# Patient Record
Sex: MALE | URBAN - METROPOLITAN AREA
[De-identification: names, ages, dates, MRNs, and addresses within clinical notes are randomized per-mention and may not be internally consistent; named-entity substitution may affect disease eponyms.]

---

## 2020-04-14 NOTE — TELEPHONE ENCOUNTER
FUTURE VISIT INFORMATION      FUTURE VISIT INFORMATION:    Date: 6/16/2020    Time: 1PM    Location: [facility] [place]  REFERRAL INFORMATION:    Referring provider:      Referring providers clinic:      Reason for visit/diagnosis  sleep apnea // scheduled by patient // records unknown could not retrieve //    RECORDS REQUESTED FROM:       Clinic name Comments Records Status Imaging Status   [facility]   12/12/2008 Sleep Study    Report in care Everywhere     [facility]   07/27/2015 notes with Nico Dinero MD   Care Everywhere

## 2020-04-30 ENCOUNTER — TELEPHONE (OUTPATIENT)
Dept: OTOLARYNGOLOGY | Facility: CLINIC | Age: 45
End: 2020-04-30

## 2020-04-30 NOTE — TELEPHONE ENCOUNTER
Called patient and left a VM.     Offered a sooner virtual/telephone visit with Dr. Chapman.      Patient is welcome to reschedule to any open NEW slot as a virtual or phone visit if they would like to reschedule appointment to sooner date.      If patient would like to do sooner video visit, please confirm email and let patient know that a clinic staff member will reach out 2 days prior to help set up appointment. If patient would prefer phone visit please confirm best phone number for patient.

## 2020-06-12 ENCOUNTER — TELEPHONE (OUTPATIENT)
Dept: OTOLARYNGOLOGY | Facility: CLINIC | Age: 45
End: 2020-06-12

## 2020-06-12 NOTE — TELEPHONE ENCOUNTER
LVM letting pt know due to COVID-19 protocol Dr. Chapman is limited in patients that she can see in clinic at this time. Offered return video visit via [employer] touchpoint for appt on 6/16 with provider.     Left call center number for conversion/rescheduling.      If pt would like to complete video visit please confirm pt e-mail. Please send tip sheet via [employer]. Pt is welcome to do telephone appt if unable to facilitate a video appt.       If pt would prefer to wait to be seen in clinic please reschedule appts 8+ weeks out.

## 2020-06-15 NOTE — TELEPHONE ENCOUNTER
2nd attempt  LVM letting pt know due to COVID-19 protocol Dr. Chapman is limited in patients that she can see in clinic at this time. Offered return video visit via [employer] touchpoint for appt on 6/16 with provider.      Left call center number for conversion/rescheduling.      If pt would like to complete video visit please confirm pt e-mail. Please send tip sheet via [employer]. Pt is welcome to do telephone appt if unable to facilitate a video appt.       If pt would prefer to wait to be seen in clinic please reschedule appts 8+ weeks out.

## 2020-06-16 ENCOUNTER — PRE VISIT (OUTPATIENT)
Dept: OTOLARYNGOLOGY | Facility: CLINIC | Age: 45
End: 2020-06-16

## 2024-09-25 DIAGNOSIS — H40.009 GLAUCOMA SUSPECT: Primary | ICD-10-CM

## 2024-11-12 ENCOUNTER — OFFICE VISIT (OUTPATIENT)
Dept: OPHTHALMOLOGY | Facility: CLINIC | Age: 49
End: 2024-11-12
Attending: OPHTHALMOLOGY
Payer: COMMERCIAL

## 2024-11-12 DIAGNOSIS — H40.009 GLAUCOMA SUSPECT: Primary | ICD-10-CM

## 2024-11-12 DIAGNOSIS — H40.1222 LOW TENSION GLAUCOMA OF LEFT EYE, MODERATE STAGE: Primary | ICD-10-CM

## 2024-11-12 PROCEDURE — 99204 OFFICE O/P NEW MOD 45 MIN: CPT | Performed by: OPHTHALMOLOGY

## 2024-11-12 PROCEDURE — 92083 EXTENDED VISUAL FIELD XM: CPT | Performed by: OPHTHALMOLOGY

## 2024-11-12 PROCEDURE — G0463 HOSPITAL OUTPT CLINIC VISIT: HCPCS | Performed by: OPHTHALMOLOGY

## 2024-11-12 PROCEDURE — 76514 ECHO EXAM OF EYE THICKNESS: CPT | Performed by: OPHTHALMOLOGY

## 2024-11-12 PROCEDURE — 92133 CPTRZD OPH DX IMG PST SGM ON: CPT | Performed by: OPHTHALMOLOGY

## 2024-11-12 PROCEDURE — 92015 DETERMINE REFRACTIVE STATE: CPT

## 2024-11-12 RX ORDER — LATANOPROST 50 UG/ML
1 SOLUTION/ DROPS OPHTHALMIC AT BEDTIME
Qty: 2.5 ML | Refills: 11 | Status: SHIPPED | OUTPATIENT
Start: 2024-11-12

## 2024-11-12 ASSESSMENT — REFRACTION_MANIFEST
OS_SPHERE: -1.50
OD_CYLINDER: +1.00
OD_SPHERE: -1.00
OS_ADD: +1.75
OD_ADD: +1.75
OS_AXIS: 165
OD_AXIS: 015
OS_CYLINDER: +0.75

## 2024-11-12 ASSESSMENT — CONF VISUAL FIELD
OD_NORMAL: 1
OS_INFERIOR_TEMPORAL_RESTRICTION: 0
OD_SUPERIOR_TEMPORAL_RESTRICTION: 0
OS_SUPERIOR_NASAL_RESTRICTION: 0
OD_INFERIOR_NASAL_RESTRICTION: 0
OD_INFERIOR_TEMPORAL_RESTRICTION: 0
OS_NORMAL: 1
OD_SUPERIOR_NASAL_RESTRICTION: 0
OS_INFERIOR_NASAL_RESTRICTION: 0
OS_SUPERIOR_TEMPORAL_RESTRICTION: 0

## 2024-11-12 ASSESSMENT — TONOMETRY
IOP_METHOD: TONOPEN
OD_IOP_MMHG: 15
OS_IOP_MMHG: 17

## 2024-11-12 ASSESSMENT — VISUAL ACUITY
METHOD: SNELLEN - LINEAR
OD_SC: 20/20
OD_SC+: -2
OS_SC+: -2
OS_SC: 20/20

## 2024-11-12 ASSESSMENT — PACHYMETRY
OD_CT(UM): 466
OS_CT(UM): 463

## 2024-11-12 ASSESSMENT — CUP TO DISC RATIO
OD_RATIO: 0.7
OS_RATIO: 0.90

## 2024-11-12 ASSESSMENT — SLIT LAMP EXAM - LIDS
COMMENTS: NORMAL
COMMENTS: NORMAL

## 2024-11-12 NOTE — PROGRESS NOTES
Chief Complaint/Presenting Concern: Glaucoma suspect     History of Present Illness:   Dilip Escobar is a 49 year old patient who presents for evaluation of glaucoma suspect based on large cup to disc ration, followed up previously by Dr. Clark at [facility].     Relevant Past Medical/Family/Social History:  Type 2 Diabetes Mellitus  Obstructive sleep apnea  Essential Hypertension  Adjustment disorder with anxiety    Relevant Review of Systems: None      Diagnosis: Glaucoma suspect due to abnormal disc or VF findings with low IOP  Steroid Response?? Patient not sure  Previous glaucoma surgery/laser  Maximum intraocular pressure 15/17  Currently Meds: None   Family history: negative  CCT: 466/463  Gonio: open each eye to scleral spur   Refractive status: myopia  Trauma history: negative  Steroid exposure: positive in 2010 he was prescribed topical steroids for eye allergy for about two years   Vasospastic disease: Migrane/Raynaud phenomenon: negative  A past hemodynamic crisis or Low BP:: negative  Meds AEs/intolerance:  PMHx: No history of Asthma and respiratory problems/Cardiac/Renal/Kidney stones/Sulfa Allergy    Today's testing:  Visual field November 12, 2024:  Right eye - none specific depressed points, baseline   Left eye - superior arcuate? Inferior depression, baseline  OCT Optic Nerve RNFL Spectralis November 12, 2024  right eye: mild temporal RNFL thinning, baseline   left eye: superior, inferior, and temporal RNFL thinnning, baseline     Additional Ocular History:    Myopia each eye   2. Dry eyes     Plan/Recommendations:  Discussed findings with patient.  Suspect NTG left eye in the setting of thin CCT. Recommend starting treatment. There is a chance patient previously had elevated IOP in the setting of intermittent use of steroid which would explain patient's ONH damage.   Start Latanoprost at bedtime each eye   Do MRI Brain /Orbit with and without contrast to rule out none glaucomatous ONH damage  given that IOP within normal NTG not very common in patient's age population,      C VA, IOP, Dilate     Physician Attestation     Attending Physician Attestation:  Complete documentation of historical and exam elements from today's encounter can be found in the full encounter summary report (not reduplicated in this progress note). I personally obtained the chief complaint(s) and history of present illness. I confirmed and edited as necessary the review of systems, past medical/surgical history, family history, social history, and examination findings as documented by others; and I examined the patient myself. I personally reviewed the relevant tests, images, and reports as documented above. I personally reviewed the ophthalmic test(s) associated with this encounter. I formulated and edited as necessary the assessment and plan and discussed the findings and management plan with the patient and any family members present at the time of the visit.  Vaishali Arroyo M.D., Glaucoma, November 12, 2024

## 2024-11-12 NOTE — NURSING NOTE
Chief Complaints and History of Present Illnesses   Patient presents with    Glaucoma Suspect Evaluation     Chief Complaint(s) and History of Present Illness(es)       Glaucoma Suspect Evaluation              Laterality: both eyes              Comments    Pt. States that he was seen in 2016 and told that he was a glaucoma suspect. Unsure if there is a family history of glaucoma. No pain BE. No flashes or floaters BE. No concerns with vision. Does have occasional eye fatigue after working on computer.   Natalia Fontanez COT 8:39 AM November 12, 2024

## 2024-11-23 ENCOUNTER — HEALTH MAINTENANCE LETTER (OUTPATIENT)
Age: 49
End: 2024-11-23